# Patient Record
Sex: MALE | Race: WHITE
[De-identification: names, ages, dates, MRNs, and addresses within clinical notes are randomized per-mention and may not be internally consistent; named-entity substitution may affect disease eponyms.]

---

## 2015-05-20 VITALS — SYSTOLIC BLOOD PRESSURE: 103 MMHG | DIASTOLIC BLOOD PRESSURE: 60 MMHG

## 2020-07-06 ENCOUNTER — HOSPITAL ENCOUNTER (OUTPATIENT)
Dept: HOSPITAL 61 - LAB | Age: 80
Discharge: HOME | End: 2020-07-06
Attending: SURGERY
Payer: COMMERCIAL

## 2020-07-06 DIAGNOSIS — Z11.59: Primary | ICD-10-CM

## 2020-07-10 ENCOUNTER — HOSPITAL ENCOUNTER (OUTPATIENT)
Dept: HOSPITAL 61 - ENDOS | Age: 80
Discharge: HOME | End: 2020-07-10
Attending: SURGERY
Payer: COMMERCIAL

## 2020-07-10 VITALS — SYSTOLIC BLOOD PRESSURE: 121 MMHG | DIASTOLIC BLOOD PRESSURE: 57 MMHG

## 2020-07-10 DIAGNOSIS — Z96.1: ICD-10-CM

## 2020-07-10 DIAGNOSIS — Z12.11: Primary | ICD-10-CM

## 2020-07-10 DIAGNOSIS — Z88.8: ICD-10-CM

## 2020-07-10 DIAGNOSIS — Z85.46: ICD-10-CM

## 2020-07-10 DIAGNOSIS — D12.2: ICD-10-CM

## 2020-07-10 DIAGNOSIS — Z98.49: ICD-10-CM

## 2020-07-10 DIAGNOSIS — F17.210: ICD-10-CM

## 2020-07-10 DIAGNOSIS — Z79.899: ICD-10-CM

## 2020-07-10 PROCEDURE — 88305 TISSUE EXAM BY PATHOLOGIST: CPT

## 2020-07-10 PROCEDURE — 45385 COLONOSCOPY W/LESION REMOVAL: CPT

## 2020-07-10 PROCEDURE — 45380 COLONOSCOPY AND BIOPSY: CPT

## 2020-07-10 RX ADMIN — SODIUM CHLORIDE, SODIUM LACTATE, POTASSIUM CHLORIDE, AND CALCIUM CHLORIDE SCH MLS/HR: .6; .31; .03; .02 INJECTION, SOLUTION INTRAVENOUS at 07:00

## 2020-07-13 NOTE — PATHOLOGY
[facility] Accession Number: [id number]

.                                                                01

Material submitted:                                        .

colon - POLYP AT 80CM

.                                                                01

Clinical history:                                          .

Colovesical fistula

.                                                                02

**********************************************************************

Diagnosis:

Colon biopsy, polyp at 80.0 cm:

- Tubular adenoma.

.

([facility]:mml; 07/13/2020)

[facility]  07/13/2020  1554 Local

**********************************************************************

.                                                                02

Comment:

There is no evidence of high grade dysplasia or malignancy.

.

([facility]:mml; 07/13/2020)

.                                                                02

Electronically signed:                                     .

Eulalio Gonzales MD, Pathologist

NPI- [id number]

.                                                                01

Gross description:                                         .

The specimen is received in formalin, labeled "Maycol Byrne, polyp at 80

cm".  Received is a segment of pale tan soft tissue measuring 0.4 cm in

maximum dimensions.  The specimen is submitted entirely in cassette A1.

([facility]; 7/10/2020)

[facility]  07/10/2020  1737 Local

.                                                                02

Pathologist provided ICD-10:

D12.6

.                                                                02

CPT                                                        .

070328

Specimen Comment: A courtesy copy of this report has been sent to [phone], [phone]

Specimen Comment: [id number]

Specimen Comment: Report sent to  / DR LILLIG

***Performed at:  01

   [facility]

   [address], [place]  [id number]

   MD Thomas Bowen MD Phone:  [phone]

***Performed at:  02

   [facility]

   [address] [place], [place]  [id number]

   MD Eulalio Gonzales MD Phone:  [phone]